# Patient Record
Sex: MALE
[De-identification: names, ages, dates, MRNs, and addresses within clinical notes are randomized per-mention and may not be internally consistent; named-entity substitution may affect disease eponyms.]

---

## 2018-08-25 ENCOUNTER — HOSPITAL ENCOUNTER (EMERGENCY)
Dept: HOSPITAL 92 - ERS | Age: 33
LOS: 1 days | Discharge: HOME | End: 2018-08-26
Payer: SELF-PAY

## 2018-08-25 DIAGNOSIS — I45.10: ICD-10-CM
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
## 2018-08-25 DIAGNOSIS — F10.129: Primary | ICD-10-CM

## 2018-08-25 DIAGNOSIS — F32.9: ICD-10-CM

## 2018-08-25 LAB
ALBUMIN SERPL BCG-MCNC: 4.5 G/DL (ref 3.5–5)
ALP SERPL-CCNC: 64 U/L (ref 40–150)
ALT SERPL W P-5'-P-CCNC: 37 U/L (ref 8–55)
ANION GAP SERPL CALC-SCNC: 17 MMOL/L (ref 10–20)
APAP SERPL-MCNC: (no result) MCG/ML (ref 10–30)
APAP SERPL-MCNC: (no result) MCG/ML (ref 10–30)
AST SERPL-CCNC: 41 U/L (ref 5–34)
BASOPHILS # BLD AUTO: 0.1 THOU/UL (ref 0–0.2)
BASOPHILS NFR BLD AUTO: 0.9 % (ref 0–1)
BILIRUB SERPL-MCNC: 2 MG/DL (ref 0.2–1.2)
BUN SERPL-MCNC: 14 MG/DL (ref 8.9–20.6)
CALCIUM SERPL-MCNC: 9.2 MG/DL (ref 7.8–10.44)
CHLORIDE SERPL-SCNC: 106 MMOL/L (ref 98–107)
CK SERPL-CCNC: 98 U/L (ref 30–200)
CO2 SERPL-SCNC: 19 MMOL/L (ref 22–29)
CREAT CL PREDICTED SERPL C-G-VRATE: 0 ML/MIN (ref 70–130)
DRUG SCREEN CUTOFF: (no result)
EOSINOPHIL # BLD AUTO: 0.1 THOU/UL (ref 0–0.7)
EOSINOPHIL NFR BLD AUTO: 0.7 % (ref 0–10)
GLOBULIN SER CALC-MCNC: 3.2 G/DL (ref 2.4–3.5)
GLUCOSE SERPL-MCNC: 123 MG/DL (ref 70–105)
HGB BLD-MCNC: 15.9 G/DL (ref 14–18)
LYMPHOCYTES # BLD: 2.9 THOU/UL (ref 1.2–3.4)
LYMPHOCYTES NFR BLD AUTO: 34.2 % (ref 21–51)
MCH RBC QN AUTO: 33.5 PG (ref 27–31)
MCV RBC AUTO: 95.5 FL (ref 78–98)
MEDTOX CONTROL LINE VALID?: (no result)
MEDTOX READER #: (no result)
MONOCYTES # BLD AUTO: 0.7 THOU/UL (ref 0.11–0.59)
MONOCYTES NFR BLD AUTO: 8.4 % (ref 0–10)
NEUTROPHILS # BLD AUTO: 4.7 THOU/UL (ref 1.4–6.5)
NEUTROPHILS NFR BLD AUTO: 55.8 % (ref 42–75)
PLATELET # BLD AUTO: 276 THOU/UL (ref 130–400)
POTASSIUM SERPL-SCNC: 4.2 MMOL/L (ref 3.5–5.1)
RBC # BLD AUTO: 4.74 MILL/UL (ref 4.7–6.1)
SALICYLATES SERPL-MCNC: (no result) MG/DL (ref 15–30)
SALICYLATES SERPL-MCNC: (no result) MG/DL (ref 15–30)
SODIUM SERPL-SCNC: 138 MMOL/L (ref 136–145)
SP GR UR STRIP: 1 (ref 1–1.04)
WBC # BLD AUTO: 8.3 THOU/UL (ref 4.8–10.8)

## 2018-08-25 PROCEDURE — 81003 URINALYSIS AUTO W/O SCOPE: CPT

## 2018-08-25 PROCEDURE — 82550 ASSAY OF CK (CPK): CPT

## 2018-08-25 PROCEDURE — 36415 COLL VENOUS BLD VENIPUNCTURE: CPT

## 2018-08-25 PROCEDURE — 80053 COMPREHEN METABOLIC PANEL: CPT

## 2018-08-25 PROCEDURE — 72125 CT NECK SPINE W/O DYE: CPT

## 2018-08-25 PROCEDURE — 51702 INSERT TEMP BLADDER CATH: CPT

## 2018-08-25 PROCEDURE — 80306 DRUG TEST PRSMV INSTRMNT: CPT

## 2018-08-25 PROCEDURE — 70450 CT HEAD/BRAIN W/O DYE: CPT

## 2018-08-25 PROCEDURE — 84443 ASSAY THYROID STIM HORMONE: CPT

## 2018-08-25 PROCEDURE — 93005 ELECTROCARDIOGRAM TRACING: CPT

## 2018-08-25 PROCEDURE — 85025 COMPLETE CBC W/AUTO DIFF WBC: CPT

## 2018-08-25 PROCEDURE — 80307 DRUG TEST PRSMV CHEM ANLYZR: CPT

## 2018-08-25 NOTE — CT
CT CERVICAL SPINE WITHOUT CONTRAST:

Multiple axial tomograms were obtained through the cervical spine with multiplanar reconstruction.

 

INDICATIONS:

Fall with injury to head and neck.

 

FINDINGS: 

Cervical vertebrae maintain normal height and alignment.  Disk spaces are preserved.  No evidence of 
fracture.

 

IMPRESSION: 

No evidence of cervical spine fracture.

 

POS: [facility]

## 2018-08-25 NOTE — CT
CT HEAD WITHOUT COTNRAST:

Multiple axial tomograms were obtained through the head without IV enhancement.

 

INDICATION: 

Mental status change.

 

FINDINGS: 

Ventricles have normal size and position.  There is no evidence of intracranial hemorrhage or mass.  
No evidence of infarct.  Sinuses and mastoids are well aerated.  

 

IMPRESSION: 

No acute findings.

 

POS: [facility]